# Patient Record
Sex: FEMALE | Race: BLACK OR AFRICAN AMERICAN | Employment: PART TIME | URBAN - METROPOLITAN AREA
[De-identification: names, ages, dates, MRNs, and addresses within clinical notes are randomized per-mention and may not be internally consistent; named-entity substitution may affect disease eponyms.]

---

## 2021-03-02 ENCOUNTER — HOSPITAL ENCOUNTER (INPATIENT)
Age: 36
LOS: 4 days | Discharge: HOME | End: 2021-03-06
Attending: OBSTETRICS & GYNECOLOGY | Admitting: OBSTETRICS & GYNECOLOGY
Payer: OTHER GOVERNMENT

## 2021-03-02 PROBLEM — Z33.1 IUP (INTRAUTERINE PREGNANCY), INCIDENTAL: Status: ACTIVE | Noted: 2021-03-02

## 2021-03-02 PROBLEM — Z3A.40 40 WEEKS GESTATION OF PREGNANCY: Status: ACTIVE | Noted: 2021-03-02

## 2021-03-02 LAB
ABO + RH BLD: NORMAL
BASOPHILS # BLD: 0 K/UL (ref 0–0.1)
BASOPHILS NFR BLD: 0 % (ref 0–2)
BLOOD GROUP ANTIBODIES SERPL: NORMAL
DIFFERENTIAL METHOD BLD: ABNORMAL
EOSINOPHIL # BLD: 0.1 K/UL (ref 0–0.4)
EOSINOPHIL NFR BLD: 1 % (ref 0–5)
ERYTHROCYTE [DISTWIDTH] IN BLOOD BY AUTOMATED COUNT: 13.6 % (ref 11.6–14.5)
HCT VFR BLD AUTO: 39.2 % (ref 35–45)
HGB BLD-MCNC: 13.3 G/DL (ref 12–16)
LYMPHOCYTES # BLD: 1.6 K/UL (ref 0.9–3.6)
LYMPHOCYTES NFR BLD: 20 % (ref 21–52)
MCH RBC QN AUTO: 32.4 PG (ref 24–34)
MCHC RBC AUTO-ENTMCNC: 33.9 G/DL (ref 31–37)
MCV RBC AUTO: 95.6 FL (ref 74–97)
MONOCYTES # BLD: 0.4 K/UL (ref 0.05–1.2)
MONOCYTES NFR BLD: 6 % (ref 3–10)
NEUTS SEG # BLD: 5.6 K/UL (ref 1.8–8)
NEUTS SEG NFR BLD: 73 % (ref 40–73)
PLATELET # BLD AUTO: 218 K/UL (ref 135–420)
PMV BLD AUTO: 10.5 FL (ref 9.2–11.8)
RBC # BLD AUTO: 4.1 M/UL (ref 4.2–5.3)
SPECIMEN EXP DATE BLD: NORMAL
WBC # BLD AUTO: 7.7 K/UL (ref 4.6–13.2)

## 2021-03-02 PROCEDURE — 65270000029 HC RM PRIVATE

## 2021-03-02 PROCEDURE — 77030028565 HC CATH CERV RIPNG BLN COOK -B

## 2021-03-02 PROCEDURE — 59200 INSERT CERVICAL DILATOR: CPT

## 2021-03-02 PROCEDURE — 85025 COMPLETE CBC W/AUTO DIFF WBC: CPT

## 2021-03-02 PROCEDURE — 75410000002 HC LABOR FEE PER 1 HR

## 2021-03-02 PROCEDURE — 86901 BLOOD TYPING SEROLOGIC RH(D): CPT

## 2021-03-02 RX ORDER — ASPIRIN 81 MG/1
TABLET ORAL DAILY
COMMUNITY
End: 2021-03-06

## 2021-03-02 RX ORDER — OXYTOCIN/RINGER'S LACTATE 30/500 ML
10 PLASTIC BAG, INJECTION (ML) INTRAVENOUS AS NEEDED
Status: COMPLETED | OUTPATIENT
Start: 2021-03-02 | End: 2021-03-04

## 2021-03-02 RX ORDER — HYDROMORPHONE HYDROCHLORIDE 1 MG/ML
1 INJECTION, SOLUTION INTRAMUSCULAR; INTRAVENOUS; SUBCUTANEOUS
Status: DISCONTINUED | OUTPATIENT
Start: 2021-03-02 | End: 2021-03-04 | Stop reason: HOSPADM

## 2021-03-02 RX ORDER — LIDOCAINE HYDROCHLORIDE 10 MG/ML
20 INJECTION, SOLUTION EPIDURAL; INFILTRATION; INTRACAUDAL; PERINEURAL AS NEEDED
Status: DISCONTINUED | OUTPATIENT
Start: 2021-03-02 | End: 2021-03-04 | Stop reason: HOSPADM

## 2021-03-02 RX ORDER — NALBUPHINE HYDROCHLORIDE 10 MG/ML
10 INJECTION, SOLUTION INTRAMUSCULAR; INTRAVENOUS; SUBCUTANEOUS
Status: DISCONTINUED | OUTPATIENT
Start: 2021-03-02 | End: 2021-03-04 | Stop reason: HOSPADM

## 2021-03-02 RX ORDER — METHYLERGONOVINE MALEATE 0.2 MG/ML
0.2 INJECTION INTRAVENOUS AS NEEDED
Status: DISCONTINUED | OUTPATIENT
Start: 2021-03-02 | End: 2021-03-04 | Stop reason: HOSPADM

## 2021-03-02 RX ORDER — CALCIUM CARBONATE/VITAMIN D3 600 MG-125
TABLET ORAL
COMMUNITY

## 2021-03-02 RX ORDER — TERBUTALINE SULFATE 1 MG/ML
0.25 INJECTION SUBCUTANEOUS
Status: DISCONTINUED | OUTPATIENT
Start: 2021-03-02 | End: 2021-03-04 | Stop reason: HOSPADM

## 2021-03-02 RX ORDER — MINERAL OIL
30 OIL (ML) ORAL AS NEEDED
Status: DISCONTINUED | OUTPATIENT
Start: 2021-03-02 | End: 2021-03-04 | Stop reason: HOSPADM

## 2021-03-02 RX ORDER — OXYTOCIN/0.9 % SODIUM CHLORIDE 30/500 ML
87.3 PLASTIC BAG, INJECTION (ML) INTRAVENOUS AS NEEDED
Status: COMPLETED | OUTPATIENT
Start: 2021-03-02 | End: 2021-03-04

## 2021-03-02 RX ORDER — MELATONIN
DAILY
COMMUNITY

## 2021-03-02 RX ORDER — OMEGA-3 FATTY ACIDS/FISH OIL 300-500 MG
CAPSULE ORAL
COMMUNITY

## 2021-03-02 RX ORDER — BUTORPHANOL TARTRATE 2 MG/ML
2 INJECTION INTRAMUSCULAR; INTRAVENOUS
Status: DISCONTINUED | OUTPATIENT
Start: 2021-03-02 | End: 2021-03-04 | Stop reason: HOSPADM

## 2021-03-02 RX ORDER — SODIUM CHLORIDE, SODIUM LACTATE, POTASSIUM CHLORIDE, CALCIUM CHLORIDE 600; 310; 30; 20 MG/100ML; MG/100ML; MG/100ML; MG/100ML
125 INJECTION, SOLUTION INTRAVENOUS CONTINUOUS
Status: DISCONTINUED | OUTPATIENT
Start: 2021-03-02 | End: 2021-03-04 | Stop reason: HOSPADM

## 2021-03-02 NOTE — PROGRESS NOTES
1600  at 40.6 weeks arrives to unit for scheduled induction. Pt taken to labor room 4. Pt oriented to room and call llamas. [address] [facility] Tomasz Vidal at bedside. SVE: closed    1730 Cook balloon placed 80/80ml. Pt tolerates well. 1930 Bedside and verbal report given to ZACH Nicolas

## 2021-03-03 PROBLEM — Z34.90 ENCOUNTER FOR INDUCTION OF LABOR: Status: ACTIVE | Noted: 2021-03-03

## 2021-03-03 PROCEDURE — 74011250636 HC RX REV CODE- 250/636: Performed by: ADVANCED PRACTICE MIDWIFE

## 2021-03-03 PROCEDURE — 65270000029 HC RM PRIVATE

## 2021-03-03 RX ORDER — OXYTOCIN/0.9 % SODIUM CHLORIDE 30/500 ML
0-20 PLASTIC BAG, INJECTION (ML) INTRAVENOUS
Status: DISCONTINUED | OUTPATIENT
Start: 2021-03-03 | End: 2021-03-06 | Stop reason: HOSPADM

## 2021-03-03 RX ORDER — FENTANYL/ROPIVACAINE/NS/PF 2MCG/ML-.1
PLASTIC BAG, INJECTION (ML) EPIDURAL
Status: COMPLETED
Start: 2021-03-03 | End: 2021-03-04

## 2021-03-03 RX ORDER — FENTANYL CITRATE 50 UG/ML
INJECTION, SOLUTION INTRAMUSCULAR; INTRAVENOUS
Status: COMPLETED
Start: 2021-03-03 | End: 2021-03-04

## 2021-03-03 RX ADMIN — SODIUM CHLORIDE, SODIUM LACTATE, POTASSIUM CHLORIDE, AND CALCIUM CHLORIDE 125 ML/HR: 600; 310; 30; 20 INJECTION, SOLUTION INTRAVENOUS at 23:56

## 2021-03-03 RX ADMIN — BUTORPHANOL TARTRATE 2 MG: 2 INJECTION, SOLUTION INTRAMUSCULAR; INTRAVENOUS at 18:30

## 2021-03-03 RX ADMIN — BUTORPHANOL TARTRATE 2 MG: 2 INJECTION, SOLUTION INTRAMUSCULAR; INTRAVENOUS at 21:22

## 2021-03-03 RX ADMIN — SODIUM CHLORIDE, SODIUM LACTATE, POTASSIUM CHLORIDE, AND CALCIUM CHLORIDE 125 ML/HR: 600; 310; 30; 20 INJECTION, SOLUTION INTRAVENOUS at 20:05

## 2021-03-03 RX ADMIN — Medication 1 MILLI-UNITS/MIN: at 05:57

## 2021-03-03 NOTE — PROGRESS NOTES
Labor Progress Note  Patient seen, fetal heart rate and contraction pattern evaluated, patient examined. Patient reports increased pain with uc's.  REquets vaginal exam  Patient Vitals for the past 1 hrs:   Temp   03/03/21 1806 97.9 °F (36.6 °C)       Physical Exam:  Cervical Exam:  5 cm dilated    80% effaced    0 station    Presenting Part: cephalic  Cervical Position: mid position  Membranes:  leaking clear fluid  Uterine Activity: Frequency: Every 4 minutes, Duration: 90 seconds and Intensity: moderate  Fetal Heart Rate: Baseline: 140 per minute  Variability: moderate  Accelerations: yes  Decelerations: none  Uterine contractions: regular, every 4 minutes    Assessment/Plan:  Reassuring fetal status, Labor  Progressing normally, Continue plan for vaginal delivery

## 2021-03-03 NOTE — PROGRESS NOTES
Labor Progress Note  Patient seen, fetal heart rate and contraction pattern evaluated, patient examined. Patient Vitals for the past 1 hrs:   BP Temp Pulse Resp   03/03/21 0716 104/73 97.7 °F (36.5 °C) 82 16       Physical Exam:  Cervical Exam:  4 cm dilated    80% effaced    -1 station    Presenting Part: cephalic  Cervical Position: mid position  Membranes:  Artificial Rupture of Membranes;  Amniotic Fluid: medium amount of clear fluid  Uterine Activity: Frequency: Every 3-5 minutes, Duration: 50 seconds and Intensity: mild  Fetal Heart Rate: Baseline: 130 per minute  Variability: moderate  Accelerations: yes  Decelerations: none  Uterine contractions: regular, every 3-5 minutes    Assessment/Plan:  Reassuring fetal status, Labor  Progressing normally, Continue plan for vaginal delivery

## 2021-03-03 NOTE — PROGRESS NOTES
0710 Bedside and Verbal shift change report given to [facility], RN and SN Fawn (oncoming nurse) by Rachna Castillo RN (offgoing nurse). Report included the following information SBAR, Kardex, Intake/Output, MAR and Recent Results. Patient resting in bed with  at bedside. Call bell within reach. No further needs at this time. Will continue to monitor. 812 [address] Priom Dominguez CNM at bedside discussing plan of care. SVE 4/80/-1.    0752 AROM moderate amount of clear fluid. 9525 Assisted patient to high fowlers chair with legs in margarito position. 7768 Patient up to restroom. Bed linens changed. 1212 Provided patient with wireless monitors and birthing ball. Patient to ambulate in room and use birthing ball. 1750 Assisted patient to right lateral side with left leg in stirrup. 1905 Bedside and Verbal shift change report given to CODEY Trinidad (oncoming nurse) by [facility], GIGI and SN Fawn (offgoing nurse). Report included the following information SBAR, Kardex, Intake/Output, MAR and Recent Results.

## 2021-03-03 NOTE — PROGRESS NOTES
Bedside shift change report given to CODEY Hinds RN (oncoming nurse) by Morgan Watts RN (offgoing nurse). Report included the following information SBAR, Intake/Output, MAR and Recent Results.

## 2021-03-03 NOTE — PROGRESS NOTES
Labor Progress Note  Patient seen, fetal heart rate and contraction pattern evaluated, patient examined. No data found. Physical Exam:  Cervical Exam:  Closed/Thick/High  Membranes:  Intact  Uterine Activity: Irregular  Fetal Heart Rate: Reactive  Baseline: 130 per minute  Variability: moderate  Accelerations: yes  Decelerations: none    Assessment:  IUP @ 40.6 wga here for IOL for postdates. Cook balloon to be placed followed by induction of labor with pitocin. Procedures, risks, and benefits of cook explained to patient with opportunity for answering all questions given. Verbal consent obtained. Patient was placed in lithotomy position. Elmo Walker catheter with stylet guide placed in cervical os without difficulty. Uterine balloon was tested with 50 cc then inflated to a total of 80 cc of sterile NS. Once uterine balloon was filled the vaginal balloon was filled and tested with 50 cc of NS. Once correct placement confirmed the vaginal balloon was filled to a total of 80 cc of NS. External catheter end secured to thigh. Patient tolerated procedure well. Fetal tracing remains reassuring.    Plan:  Continue to monitor for labor and reassuring fetal tracing  Anticipate   Expectant management  Plan to DC cook in 12 hours and start pitocin IV titration for IOL     Arlene Glover CNM

## 2021-03-03 NOTE — PROGRESS NOTES
Problem: Vaginal Delivery: Day of Deliver-Laboring  Goal: Activity/Safety  Outcome: Progressing Towards Goal  Goal: Nutrition/Diet  Outcome: Progressing Towards Goal  Goal: Medications  Outcome: Progressing Towards Goal  Goal: Treatments/Interventions/Procedures  Outcome: Progressing Towards Goal  Goal: *Vital signs within defined limits  Outcome: Progressing Towards Goal  Goal: *Labs within defined limits  Outcome: Progressing Towards Goal  Goal: *Hemodynamically stable  Outcome: Progressing Towards Goal  Goal: *Optimal pain control at patient's stated goal  Outcome: Progressing Towards Goal

## 2021-03-03 NOTE — H&P
History & Physical    Name: Alex Skinner MRN: [id number]  SSN: [id number]    YOB: 1985  Age: 30 y.o. Sex: female      Subjective:     Estimated Date of Delivery: 21  OB History    Para Term  AB Living   1             SAB TAB Ectopic Molar Multiple Live Births                    # Outcome Date GA Lbr Ricki/2nd Weight Sex Delivery Anes PTL Lv   1 Current                Ms. Andreas De La Torre is admitted with pregnancy at [address] [place] [address] for induction of labor due to postdates. Prenatal course was uncomplicated. Please see prenatal records for details. No past medical history on file. No past surgical history on file. Social History     Occupational History    Not on file   Tobacco Use    Smoking status: Not on file   Substance and Sexual Activity    Alcohol use: Not on file    Drug use: Not on file    Sexual activity: Not on file     No family history on file. No Known Allergies  Prior to Admission medications    Medication Sig Start Date End Date Taking? Authorizing Provider   PNV Comb #2-Iron-FA-Omega 3 29-1-400 mg cmpk Take  by mouth. Yes Provider, Historical   aspirin delayed-release 81 mg tablet Take  by mouth daily. Yes Provider, Historical   calcium-cholecalciferol, d3, (CALCIUM 600 + D) 600-125 mg-unit tab Take  by mouth. Yes Provider, Historical   cholecalciferol (Vitamin D3) (1000 Units /25 mcg) tablet Take  by mouth daily. Yes Provider, Historical   Iron BisGl &PS [id number]-[id number]-[id number] 084-86-11-4 mg-mg-mcg-mg cap Take  by mouth. Yes Provider, Historical   fish oil-omega-3 fatty acids (Fish Oil) 300-500 mg cap Take  by mouth. Yes Provider, Historical        Review of Systems: A comprehensive review of systems was negative.     Objective:     Vitals:  Vitals:    21 1633 21 1702 21 1931   BP: 117/74  106/65   Pulse: 96  90   Resp: 16  16   Temp: 97.4 °F (36.3 °C)  97.8 °F (36.6 °C)   Weight:  58.1 kg (128 lb)    Height:  5' 1\" (1.549 m)         Physical Exam:  Patient without distress.   Lung: normal respiratory effort  Abdomen: soft, nontender  Fundus: soft and non tender  Perineum: blood absent, amniotic fluid absent  Cervical Exam: Closed/Thick/High  Membranes:  Intact  Fetal Heart Rate: Reactive  Baseline: 130 per minute  Variability: moderate  Accelerations: yes  Decelerations: none          Prenatal Labs:   Lab Results   Component Value Date/Time    ABO/Rh(D) A POSITIVE 03/02/2021 04:40 PM       Impression/Plan:     Active Problems:    IUP (intrauterine pregnancy), incidental (3/2/2021)      40 weeks gestation of pregnancy (3/2/2021)         Plan:     [facility] for postdates induction of labor  Continuous EFM  Reassuring fetal status  Place IV/Routine labs  GBS negative   Place cook catheter   Start pitocin in am   Dr. Etta Bosch aware of pt admission and status      Liya Jones CNM

## 2021-03-03 NOTE — PROGRESS NOTES
0710 - Bedside and Verbal shift change report given to SN Gavin and CODEY Olson, GIGI (oncoming nurse) by Hever Serrano RN (offgoing nurse). Report included the following information SBAR, Kardex, Intake/Output, MAR and Recent Results. Patient is resting in bed with her  at bedside. Call light within reach. No further needs at this time and will continue to monitor. 26- M. Cheuvront at bedside discussing plan of care. SVE 4/80/-1.    0752- AROM. Clear and moderate amount of fluid. 5426- Assisted patient to high fowlers chair with legs in margarito position. 0500- Patient up to the bathroom. Linens changed on bed. 1212- Patient provided birthing ball. Patient to ambulate in room and use birthing ball. 65- M. Cheuvront at bedside discussing plan of care. SVE 5.5/80/0.    1912- Bedside and Verbal shift change report given to CODEY Quispe (oncoming nurse) by SN Srinivas and CODEY Olson RN (offgoing nurse). Report included the following information SBAR, Kardex, Intake/Output, MAR and Recent Results.

## 2021-03-04 ENCOUNTER — ANESTHESIA (OUTPATIENT)
Dept: LABOR AND DELIVERY | Age: 36
End: 2021-03-04
Payer: OTHER GOVERNMENT

## 2021-03-04 ENCOUNTER — ANESTHESIA EVENT (OUTPATIENT)
Dept: LABOR AND DELIVERY | Age: 36
End: 2021-03-04
Payer: OTHER GOVERNMENT

## 2021-03-04 PROCEDURE — 77030007879 HC KT SPN EPDRL TELE -B: Performed by: ANESTHESIOLOGY

## 2021-03-04 PROCEDURE — 74011000250 HC RX REV CODE- 250: Performed by: ANESTHESIOLOGY

## 2021-03-04 PROCEDURE — 75410000003 HC RECOV DEL/VAG/CSECN EA 0.5 HR

## 2021-03-04 PROCEDURE — 65270000029 HC RM PRIVATE

## 2021-03-04 PROCEDURE — 74011250636 HC RX REV CODE- 250/636

## 2021-03-04 PROCEDURE — 74011250636 HC RX REV CODE- 250/636: Performed by: ADVANCED PRACTICE MIDWIFE

## 2021-03-04 PROCEDURE — 75410000000 HC DELIVERY VAGINAL/SINGLE

## 2021-03-04 PROCEDURE — 74011250636 HC RX REV CODE- 250/636: Performed by: ANESTHESIOLOGY

## 2021-03-04 PROCEDURE — 74011250637 HC RX REV CODE- 250/637: Performed by: ADVANCED PRACTICE MIDWIFE

## 2021-03-04 PROCEDURE — 75410000002 HC LABOR FEE PER 1 HR

## 2021-03-04 PROCEDURE — 74011000250 HC RX REV CODE- 250

## 2021-03-04 PROCEDURE — 76060000078 HC EPIDURAL ANESTHESIA

## 2021-03-04 RX ORDER — ROPIVACAINE HYDROCHLORIDE 2 MG/ML
INJECTION, SOLUTION EPIDURAL; INFILTRATION; PERINEURAL AS NEEDED
Status: DISCONTINUED | OUTPATIENT
Start: 2021-03-04 | End: 2021-03-04 | Stop reason: HOSPADM

## 2021-03-04 RX ORDER — IBUPROFEN 400 MG/1
800 TABLET ORAL
Status: DISCONTINUED | OUTPATIENT
Start: 2021-03-04 | End: 2021-03-06 | Stop reason: HOSPADM

## 2021-03-04 RX ORDER — ACETAMINOPHEN 325 MG/1
650 TABLET ORAL
Status: DISCONTINUED | OUTPATIENT
Start: 2021-03-04 | End: 2021-03-06 | Stop reason: HOSPADM

## 2021-03-04 RX ORDER — OXYCODONE AND ACETAMINOPHEN 5; 325 MG/1; MG/1
2 TABLET ORAL
Status: DISCONTINUED | OUTPATIENT
Start: 2021-03-04 | End: 2021-03-06 | Stop reason: HOSPADM

## 2021-03-04 RX ORDER — ZOLPIDEM TARTRATE 5 MG/1
5 TABLET ORAL
Status: DISCONTINUED | OUTPATIENT
Start: 2021-03-04 | End: 2021-03-06 | Stop reason: HOSPADM

## 2021-03-04 RX ORDER — FENTANYL CITRATE 50 UG/ML
INJECTION, SOLUTION INTRAMUSCULAR; INTRAVENOUS AS NEEDED
Status: DISCONTINUED | OUTPATIENT
Start: 2021-03-04 | End: 2021-03-04 | Stop reason: HOSPADM

## 2021-03-04 RX ORDER — LIDOCAINE HYDROCHLORIDE AND EPINEPHRINE 15; 5 MG/ML; UG/ML
INJECTION, SOLUTION EPIDURAL AS NEEDED
Status: DISCONTINUED | OUTPATIENT
Start: 2021-03-04 | End: 2021-03-04 | Stop reason: HOSPADM

## 2021-03-04 RX ORDER — DIPHENHYDRAMINE HYDROCHLORIDE 50 MG/ML
12.5 INJECTION, SOLUTION INTRAMUSCULAR; INTRAVENOUS
Status: DISCONTINUED | OUTPATIENT
Start: 2021-03-03 | End: 2021-03-04 | Stop reason: HOSPADM

## 2021-03-04 RX ORDER — PROMETHAZINE HYDROCHLORIDE 25 MG/ML
25 INJECTION, SOLUTION INTRAMUSCULAR; INTRAVENOUS
Status: DISCONTINUED | OUTPATIENT
Start: 2021-03-04 | End: 2021-03-06 | Stop reason: HOSPADM

## 2021-03-04 RX ORDER — NALOXONE HYDROCHLORIDE 0.4 MG/ML
0.2 INJECTION, SOLUTION INTRAMUSCULAR; INTRAVENOUS; SUBCUTANEOUS AS NEEDED
Status: DISCONTINUED | OUTPATIENT
Start: 2021-03-03 | End: 2021-03-04 | Stop reason: HOSPADM

## 2021-03-04 RX ORDER — AMOXICILLIN 250 MG
1 CAPSULE ORAL
Status: DISCONTINUED | OUTPATIENT
Start: 2021-03-04 | End: 2021-03-06 | Stop reason: HOSPADM

## 2021-03-04 RX ORDER — EPHEDRINE SULFATE/0.9% NACL/PF 50 MG/5 ML
10 SYRINGE (ML) INTRAVENOUS AS NEEDED
Status: DISCONTINUED | OUTPATIENT
Start: 2021-03-03 | End: 2021-03-04 | Stop reason: HOSPADM

## 2021-03-04 RX ORDER — FENTANYL CITRATE 50 UG/ML
100 INJECTION, SOLUTION INTRAMUSCULAR; INTRAVENOUS ONCE
Status: COMPLETED | OUTPATIENT
Start: 2021-03-04 | End: 2021-03-04

## 2021-03-04 RX ORDER — FENTANYL/ROPIVACAINE/NS/PF 2MCG/ML-.1
1-15 PLASTIC BAG, INJECTION (ML) EPIDURAL
Status: DISCONTINUED | OUTPATIENT
Start: 2021-03-04 | End: 2021-03-04 | Stop reason: HOSPADM

## 2021-03-04 RX ADMIN — ROPIVACAINE HYDROCHLORIDE 4 ML: 2 INJECTION EPIDURAL; INFILTRATION; PERINEURAL at 00:42

## 2021-03-04 RX ADMIN — Medication 87.3 MILLI-UNITS/MIN: at 04:50

## 2021-03-04 RX ADMIN — IBUPROFEN 800 MG: 400 TABLET, FILM COATED ORAL at 16:43

## 2021-03-04 RX ADMIN — ACETAMINOPHEN 650 MG: 325 TABLET ORAL at 21:27

## 2021-03-04 RX ADMIN — IBUPROFEN 800 MG: 400 TABLET, FILM COATED ORAL at 08:19

## 2021-03-04 RX ADMIN — FENTANYL CITRATE 100 MCG: 50 INJECTION, SOLUTION INTRAMUSCULAR; INTRAVENOUS at 00:40

## 2021-03-04 RX ADMIN — Medication 10 ML/HR: at 00:41

## 2021-03-04 RX ADMIN — DOCUSATE SODIUM 50 MG AND SENNOSIDES 8.6 MG 1 TABLET: 8.6; 5 TABLET, FILM COATED ORAL at 21:27

## 2021-03-04 RX ADMIN — SODIUM CHLORIDE, SODIUM LACTATE, POTASSIUM CHLORIDE, AND CALCIUM CHLORIDE 125 ML/HR: 600; 310; 30; 20 INJECTION, SOLUTION INTRAVENOUS at 03:10

## 2021-03-04 RX ADMIN — FENTANYL CITRATE 100 MCG: 50 INJECTION, SOLUTION INTRAMUSCULAR; INTRAVENOUS at 00:42

## 2021-03-04 RX ADMIN — LIDOCAINE HYDROCHLORIDE,EPINEPHRINE BITARTRATE 3 ML: 15; .005 INJECTION, SOLUTION EPIDURAL; INFILTRATION; INTRACAUDAL; PERINEURAL at 00:37

## 2021-03-04 RX ADMIN — ROPIVACAINE HYDROCHLORIDE 3 ML: 2 INJECTION EPIDURAL; INFILTRATION; PERINEURAL at 00:40

## 2021-03-04 RX ADMIN — Medication 10 MG: at 02:29

## 2021-03-04 RX ADMIN — Medication 10000 MILLI-UNITS: at 04:36

## 2021-03-04 RX ADMIN — ROPIVACAINE HYDROCHLORIDE 3 ML: 2 INJECTION EPIDURAL; INFILTRATION; PERINEURAL at 00:41

## 2021-03-04 NOTE — ROUTINE PROCESS
0150: SBAR update to DIMITRY Baxter at 0112 unchanged; orders received for indwelling catheter insertion d/t no cervical change

## 2021-03-04 NOTE — PROGRESS NOTES
0830 TRANSFER - IN REPORT:    Verbal report received from Reva(name) on Gus Brooks  being received from L&D(unit) for routine progression of care      Report consisted of patients Situation, Background, Assessment and   Recommendations(SBAR). Information from the following report(s) SBAR, Procedure Summary, Intake/Output, MAR and Recent Results was reviewed with the receiving nurse. Opportunity for questions and clarification was provided. Assessment completed upon patients arrival to unit and care assumed. 2396 Notified by YOLA Mtz of MEWS 3.     0915 Pt assessment done; apical pulse noted at 98; see flowsheets for cares. Pt states pain improving after med given earlier    0934 KBraultCNM notified of MEWS and re-eval. No further orders at this time    1030 Pt resting; no needs verbalized    1145 Pt eating; family at bedside. No needs verbalized when asked    1250 pt resting quietly; denies needs when asked. Assessment  Unchanged    1400 VS noted; see flowsheets. Assisted w/ breastfeeding - see infant chart    1500 Resting quietly; no needs verbalized when asked    1630 to nursery w/ infant to observe bath    1745 mom states motrin given earlier helped. Quietly resting. Ice pad given for sumit area    1830 attempting to feed infant at present    1920 Bedside and Verbal shift change report given to [facility] (oncoming nurse) by Hieu Acosta (offgoing nurse). Report included the following information SBAR, Procedure Summary, MAR and Recent Results.

## 2021-03-04 NOTE — PROGRESS NOTES
0700- Patient resting in bed, with spouse at bedside, eating breakfast, denies need for pain meds at this time, assessment complete

## 2021-03-04 NOTE — L&D DELIVERY NOTE
Delivery Note    Obstetrician:  Candida Hutchison CNM    Assistant: none    Pre-Delivery Diagnosis: Term pregnancy, Induced labor and Single fetus    Post-Delivery Diagnosis: Living  infant(s) and Male    Intrapartum Event: None    Procedure: Spontaneous vaginal delivery    Epidural: YES    Monitor:  Fetal Heart Tones - External and Uterine Contractions - External    Indications for instrumental delivery: none    Estimated Blood Loss: 300    Episiotomy: none    Laceration(s):  2nd degree    Laceration(s) repair: YES    Presentation: Cephalic    Fetal Description: castillo    Fetal Position: Occiput Anterior    Birth Weight: not yet assessed    Birth Length: not yet assessed    Apgar - One Minute: 9    Apgar - Five Minutes: 9    Umbilical Cord: 3 vessels present  Specimens: placenta delivered intact  Complications:  none           Cord Blood Results:   Information for the patient's :  Shawn Ly [[id number]]   No results found for: PCTABR, PCTDIG, BILI, ABORH     Prenatal Labs:     Lab Results   Component Value Date/Time    ABO/Rh(D) A POSITIVE 2021 04:40 PM        Attending Attestation: I was present and scrubbed for the entire procedure

## 2021-03-04 NOTE — PROGRESS NOTES
1910 Bedside shift change report given to CODEY Cardozo (oncoming nurse) by Deny Horse student nurse with Jennifer Cui RN (offgoing nurse). Report included the following information SBAR, Kardex, Intake/Output, MAR, Recent Results and Quality Measures. GERMAN Dominguez CNM reviews strip, order to increase pitocin. 1912 pt sleeping comfortably    2050 pt rates pain 8/10, feeling some vaginal pressure     2052 pt up to void with RN assist    2100 attempt to sit on birthing ball, pt feels dizzy, pt to bed    2110 CNM bedside for SVE. SVE 6.5/90/+1. 05108 Carmelita Watson for another dose of stadol per [facility]    2115 pt to high rincon's. Heat pack applied to back    2155 pt to left lateral per CNM request. Pt unable to tolerate left lateral with peanut ball due to weight on lower leg. Pt to right lateral with peanut ball. 2226 pt repositioned to left lateral with right leg resting in stirrup. 2256 Pt reports feeling rectal pressure with contractions. pt repositioned to right lateral with left leg resting in stirrup, pillow under knee for support. 2315 pt feeling constant pressure. RN to perform SVE per CNM    2320 SVE unchanged from previous exam    9183 pt desires epidural. RN begins LR bolus. CNM udpated    0731 40 44 23 Anesthesia paged for epidural    2351 Anesthesia paged again    0000 pt up to restroom to void. Pt unable to void    0005 pt reports increased pressure, urge to push. SVE unchanged    0025 Dr. Jamie Cabrera  Bedside    0028 pt sitting up for epidural, RN attempts to trace FHR.  EFM tracing MHR    0029 time out    0031 procedure start    0035 RN remains at bedside attempting to trace FHR    0036 needle out, catheter in    0037 test dose    0040 bolus    0043 epidural complete, pt repositioned to semi-rincon's with left tilt    0049 Dr. Jamie Cabrera exits    0115 SVE unchanged    0140 pt sitting up on bedpan attempting to void    0149 pt unable to void, st. Cath inserted for 200mL    0151 pt to left lateral with peanut ball    0155 CNM orders jorgensen to be placed now and increase pitocin if FHR will tolerate it    0200 jorgensen placed with 2nd RN bedside, CODEY Myers, GIGI.    6080 SVE 7.5/90/+1    0203 left lateral with peanut ball    0219 right lateral with peanut ball    0320 pt to high rincon's. Jessika care provided, underbuttocks drapes changed. Pt denies feeling pressure, feels much better after getting some sleep    0352 RN updates CNM Cheuvront on SVE 10/100/+1. CNM en route to hospital.    0400 CNM bedside, order for jorgensen out. Jorgensen out 400mL urine    0409 CNM bedside. start pushing. RN remains at the bedside, continuously assessing fetal heart rate baseline, variability, periodic and episodic changes, and uterine activity, providing labor support, and continuously monitoring maternal status and vital signs. 6192  of viable male infant. Delayed cord clamping and infant skin to skin with tactile stimulation and bulb suction. Infant vigorous. 1728 postpartum pitocin running per protocol    0437 placenta delivered, intact    7857 repair in progress    2428 provider exits    0500 jessika care provided, jessika pad and ice pack applied. 0700 LR and pitocin infusions discontinued per protocol     0705 pt up to restroom to void with RN assist    0715 Bedside shift change report given to DEBRA Mckinney (oncoming nurse) by He Vines. Ingrid Phipps (offgoing nurse). Report included the following information SBAR, Kardex, MAR, Recent Results and Quality Measures.

## 2021-03-04 NOTE — PROGRESS NOTES
Problem: Patient Education: Go to Patient Education Activity  Goal: Patient/Family Education  3/3/2021 1934 by Larwance Fee  Outcome: Progressing Towards Goal  3/3/2021 1933 by Larwance Fee  Outcome: Progressing Towards Goal     Problem: Vaginal Delivery: Day of Deliver-Laboring  Goal: Activity/Safety  3/3/2021 1934 by Larwance Fee  Outcome: Progressing Towards Goal  3/3/2021 1933 by Larwance Fee  Outcome: Progressing Towards Goal  Goal: Consults, if ordered  3/3/2021 1934 by Larwance Fee  Outcome: Progressing Towards Goal  3/3/2021 1933 by Larwance Fee  Outcome: Progressing Towards Goal  Goal: Diagnostic Test/Procedures  3/3/2021 1934 by Larwance Fee  Outcome: Progressing Towards Goal  3/3/2021 1933 by Larwance Fee  Outcome: Progressing Towards Goal  Goal: Nutrition/Diet  3/3/2021 1934 by Larwance Fee  Outcome: Progressing Towards Goal  3/3/2021 1933 by Larwance Fee  Outcome: Progressing Towards Goal  Goal: Discharge Planning  3/3/2021 1934 by Larwance Fee  Outcome: Progressing Towards Goal  3/3/2021 1933 by Larwance Fee  Outcome: Progressing Towards Goal  Goal: Medications  3/3/2021 1934 by Larwance Fee  Outcome: Progressing Towards Goal  3/3/2021 1933 by Larwance Fee  Outcome: Progressing Towards Goal  Goal: Respiratory  3/3/2021 1934 by Larwance Fee  Outcome: Progressing Towards Goal  3/3/2021 1933 by Larwance Fee  Outcome: Progressing Towards Goal  Goal: Treatments/Interventions/Procedures  3/3/2021 1934 by Larwance Fee  Outcome: Progressing Towards Goal  3/3/2021 1933 by Larwance Fee  Outcome: Progressing Towards Goal  Goal: *Vital signs within defined limits  3/3/2021 1934 by Larwance Fee  Outcome: Progressing Towards Goal  3/3/2021 1933 by Larwance Fee  Outcome: Progressing Towards Goal  Goal: *Labs within defined limits  3/3/2021 1934 by Larwance Fee  Outcome: Progressing Towards Goal  3/3/2021 1933 by Anderson Staton  Outcome: Progressing Towards Goal  Goal: *Hemodynamically stable  3/3/2021 1934 by Anderson Staton  Outcome: Progressing Towards Goal  3/3/2021 1933 by Anderson Staton  Outcome: Progressing Towards Goal  Goal: *Optimal pain control at patient's stated goal  3/3/2021 1934 by Anderson Staton  Outcome: Progressing Towards Goal  3/3/2021 1933 by Anderson Staton  Outcome: Progressing Towards Goal     Problem: Pain  Goal: *Control of Pain  Outcome: Progressing Towards Goal     Problem: Patient Education: Go to Patient Education Activity  Goal: Patient/Family Education  Outcome: Progressing Towards Goal

## 2021-03-04 NOTE — ANESTHESIA PREPROCEDURE EVALUATION
Relevant Problems   No relevant active problems       Anesthetic History   No history of anesthetic complications            Review of Systems / Medical History  Patient summary reviewed, nursing notes reviewed and pertinent labs reviewed    Pulmonary  Within defined limits                 Neuro/Psych              Cardiovascular  Within defined limits                Exercise tolerance: >4 METS     GI/Hepatic/Renal     GERD           Endo/Other  Within defined limits           Other Findings            Physical Exam    Airway  Mallampati: II  TM Distance: 4 - 6 cm  Neck ROM: normal range of motion   Mouth opening: Normal     Cardiovascular               Dental      Comments: Chipped front uppers   Pulmonary                 Abdominal         Other Findings            Anesthetic Plan    ASA: 2  Anesthesia type: epidural            Anesthetic plan and risks discussed with: Patient      Risks of epidural, including but not limited to bleeding, infection, back pain, headache, seizure, nerve injury, and block failure discussed with patient and accepted.

## 2021-03-04 NOTE — PROGRESS NOTES
Labor Progress Note  Patient seen, fetal heart rate and contraction pattern evaluated, patient examined.   Patient Vitals for the past 1 hrs:   BP Pulse SpO2   03/03/21 2127   98 %   03/03/21 2122   100 %   03/03/21 2117   100 %   03/03/21 2112   100 %   03/03/21 2107  83 100 %   03/03/21 2106 121/78     03/03/21 2103   100 %       Physical Exam:  Cervical Exam:  7 cm dilated    90% effaced    +1 station    Presenting Part: cephalic  Cervical Position: anterior  Membranes:  leaking clear fluid  Uterine Activity: Frequency: Every 2-3 minutes, Duration: 70 seconds and Intensity: moderate  Fetal Heart Rate: Baseline: 130 per minute  Variability: moderate  Accelerations: yes  Decelerations: none    Assessment/Plan:  Reassuring fetal status, Labor  Progressing normally, Continue plan for vaginal delivery

## 2021-03-04 NOTE — LACTATION NOTE
Attempted feeding, but infant very spitty. Hand expression education completed with mom, but unable to express at this time. Mom verbalized understanding and no questions at this time. Encouraged to attempt feeding infant within 1-2 hours as mom wants to take a nap. Mom educated on breastfeeding basics--hunger cues, feeding on demand, waking baby if baby sleeps too long between feeds, importance of skin to skin, positioning and latching, risk of pacifier use and supplemental feedings, and importance of rooming in--and use of log sheet. Mom also educated on benefits of breastfeeding for herself and baby. Mom verbalized understanding. No questions at this time. Updated ROMULO Houston RN    Attempted feeding, but infant very spitty and sleepy. Attempted to hand express, but mom only able to express 1 drop and fed to infant. Encouraged to attempt feeding infant within 1 hour. Mom verbalized understanding and no questions at this time. Updated ROMULO Houston RN.    1320 Attempted feeding and infant able to latch off and on. Encouraged to continue frequent feedings until infant nurses for at least 10 minutes.

## 2021-03-04 NOTE — ANESTHESIA PROCEDURE NOTES
Epidural Block    Patient location during procedure: OB  Start time: 3/4/2021 12:25 AM  Reason for block: labor epidural  Staffing  Performed: attending   Anesthesiologist: Steve Powell MD  Preanesthetic Checklist  Completed: patient identified, IV checked, site marked, risks and benefits discussed, surgical consent, monitors and equipment checked, pre-op evaluation and timeout performed  Block Placement  Patient position: sitting  Prep: ChloraPrep  Sterility prep: cap, drape, gloves, mask and hand  Sedation level: no sedation  Patient monitoring: continuous pulse oximetry and heart rate (NIBP)  Location: lumbar  Lumbar location: L3-L4  Epidural  Loss of resistance technique: saline  Guidance: landmark technique  Needle  Needle type: Tuohy   Needle gauge: 17 G  Needle insertion depth: 6 cm  Catheter type: multi-orifice  Catheter size: 19 G  Catheter at skin depth: 12 cm  Catheter securement method: clear occlusive dressing and surgical tape  Test dose: negative  Assessment  Block outcome: pain improved  Number of attempts: 1  Procedure assessment: patient tolerated procedure well with no complications

## 2021-03-05 LAB
HCT VFR BLD AUTO: 26.6 % (ref 35–45)
HGB BLD-MCNC: 9.3 G/DL (ref 12–16)

## 2021-03-05 PROCEDURE — 85018 HEMOGLOBIN: CPT

## 2021-03-05 PROCEDURE — 36415 COLL VENOUS BLD VENIPUNCTURE: CPT

## 2021-03-05 PROCEDURE — 65270000029 HC RM PRIVATE

## 2021-03-05 PROCEDURE — 85014 HEMATOCRIT: CPT

## 2021-03-05 PROCEDURE — 74011250637 HC RX REV CODE- 250/637: Performed by: ADVANCED PRACTICE MIDWIFE

## 2021-03-05 RX ORDER — IBUPROFEN 800 MG/1
800 TABLET ORAL
Qty: 90 TAB | Refills: 1 | Status: SHIPPED | OUTPATIENT
Start: 2021-03-05

## 2021-03-05 RX ADMIN — IBUPROFEN 800 MG: 400 TABLET, FILM COATED ORAL at 23:40

## 2021-03-05 RX ADMIN — IBUPROFEN 800 MG: 400 TABLET, FILM COATED ORAL at 15:39

## 2021-03-05 RX ADMIN — DOCUSATE SODIUM 50 MG AND SENNOSIDES 8.6 MG 1 TABLET: 8.6; 5 TABLET, FILM COATED ORAL at 08:58

## 2021-03-05 NOTE — LACTATION NOTE
This note was copied from a baby's chart. [phone] [id number] per mom, infant latching and nursing well for 30 minutes at 1430. DOL discussed. Mom verbalized understanding. Encouraged to call for assistance as needed. 0  is very sleepy. Per parents, have been trying to wake for a feeding for several hours. Discussed DOL behaviors and expectations. Hand expression education completed with mom and handout with spoon given for reinforcement. Showed how to feed infant expressed colostrum with spoon. Mom verbalized understanding and no questions at this time. Spoon fed several drops. Placed  skin to skin with mom. Encouraged to attempt within the hour.

## 2021-03-05 NOTE — DISCHARGE SUMMARY
Obstetrical Discharge Summary     Name: Suleiman Joseph MRN: [id number]  SSN: [id number]    YOB: 1985  Age: 30 y.o. Sex: female      Allergies: Patient has no known allergies. Admit Date: 3/2/2021    Discharge Date: 3/6/2021    Admitting Physician: Cherie Rodriguez MD     Attending Physician:  Ena Garcia MD     * Admission Diagnoses: Encounter for induction of labor [Z34.90]    * Discharge Diagnoses:   Information for the patient's :  Ashkan Pack [[id number]]   Delivery of a 3.285 kg male infant via Vaginal, Spontaneous on 3/4/2021 at 4:28 AM  by [address],. Apgars were 9  and 9 . Additional Diagnoses:   Hospital Problems as of 3/5/2021 Date Reviewed: 3/4/2021          Codes Class Noted - Resolved POA    Encounter for induction of labor ICD-10-CM: Z34.90  ICD-9-CM: V22.1  3/3/2021 - Present Unknown        IUP (intrauterine pregnancy), incidental ICD-10-CM: Z33.1  ICD-9-CM: V22.2  3/2/2021 - Present Yes        40 weeks gestation of pregnancy ICD-10-CM: Z3A.40  ICD-9-CM: V22.2  3/2/2021 - Present Yes             Lab Results   Component Value Date/Time    ABO/Rh(D) A POSITIVE 2021 04:40 PM    There is no immunization history for the selected administration types on file for this patient. * Procedures:   * No surgery found *           * Discharge Condition: good    [facility] Course: Normal hospital course following the delivery. * Disposition: Home    Discharge Medications:   Current Discharge Medication List      START taking these medications    Details   ibuprofen (MOTRIN) 800 mg tablet Take 1 Tab by mouth every eight (8) hours as needed for Pain. Qty: 90 Tab, Refills: 1         CONTINUE these medications which have NOT CHANGED    Details   PNV Comb #2-Iron-FA-Omega 3 29-1-400 mg cmpk Take  by mouth.      calcium-cholecalciferol, d3, (CALCIUM 600 + D) 600-125 mg-unit tab Take  by mouth.       cholecalciferol (Vitamin D3) (1000 Units /25 mcg) tablet Take  by mouth daily. fish oil-omega-3 fatty acids (Fish Oil) 300-500 mg cap Take  by mouth. STOP taking these medications       aspirin delayed-release 81 mg tablet Comments:   Reason for Stopping:         Iron BisGl &PS [id number] [id number] mg-mg-mcg-mg cap Comments:   Reason for Stopping:               * Follow-up Care/Patient Instructions: Activity: Activity as tolerated, No sex for 6 weeks, No driving while on analgesics and No heavy lifting for 6 weeks  Diet: Regular Diet  Wound Care: pericare as directed    Follow-up Information     Follow up With Specialties Details Why Contact Info    Zainab Joshua MD Family Medicine   [facility]  [phone] [address] [facility]  Call Call next week to schedule routine 6 week postpartum visit. [address]  [phone]                      .

## 2021-03-05 NOTE — PROGRESS NOTES
Problem: Patient Education: Go to Patient Education Activity  Goal: Patient/Family Education  3/5/2021 1404 by Gage Singleton LPN  Outcome: Progressing Towards Goal  3/5/2021 1403 by Gage Singleton LPN  Outcome: Progressing Towards Goal     Problem: Pain  Goal: *Control of Pain  3/5/2021 1404 by Gage Singleton LPN  Outcome: Progressing Towards Goal  3/5/2021 1403 by Gage Singleton LPN  Outcome: Progressing Towards Goal     Problem: Patient Education: Go to Patient Education Activity  Goal: Patient/Family Education  3/5/2021 1404 by Gage Singleton LPN  Outcome: Progressing Towards Goal  3/5/2021 1403 by Gage Singleton LPN  Outcome: Progressing Towards Goal     Problem: Vaginal Delivery: Postpartum Day 1  Goal: Activity/Safety  Outcome: Progressing Towards Goal  Goal: Consults, if ordered  Outcome: Progressing Towards Goal  Goal: Diagnostic Test/Procedures  Outcome: Progressing Towards Goal  Goal: Nutrition/Diet  Outcome: Progressing Towards Goal  Goal: Discharge Planning  Outcome: Progressing Towards Goal  Goal: Medications  Outcome: Progressing Towards Goal  Goal: Treatments/Interventions/Procedures  Outcome: Progressing Towards Goal  Goal: Psychosocial  Outcome: Progressing Towards Goal  Goal: *Vital signs within defined limits  Outcome: Progressing Towards Goal  Goal: *Labs within defined limits  Outcome: Progressing Towards Goal  Goal: *Hemodynamically stable  Outcome: Progressing Towards Goal  Goal: *Optimal pain control at patient's stated goal  Outcome: Progressing Towards Goal  Goal: *Participates in infant care  Outcome: Progressing Towards Goal  Goal: *Demonstrates progressive activity  Outcome: Progressing Towards Goal  Goal: *Performs self perineal care  Outcome: Progressing Towards Goal  Goal: *Appropriate parent-infant bonding  Outcome: Progressing Towards Goal  Goal: *Tolerating diet  Outcome: Progressing Towards Goal  Goal: *Performs self breast care  Outcome: Progressing Towards Goal

## 2021-03-05 NOTE — LACTATION NOTE
This note was copied from a baby's chart. 1658 infant latching and nursing well with NS. Reviewed Breastfeeding discharge teaching completed to include feeding on demand, foremilk and hindmilk importance, engorgement, mastitis, clogged ducts, pumping, breastmilk storage, and returning to work. Information given about unit and office phone numbers and encouraged mom to reach out if concerns arise, but that [facility] would be calling her in the next few days to follow up on breastfeeding. Mom verbalized understanding and no questions at this time.

## 2021-03-05 NOTE — ANESTHESIA POSTPROCEDURE EVALUATION
3/5/2021  3:13 PM    Laboring Epidural Follow-up Note       Referring physician: Pamela Way,*   Patient status post vaginal delivery with labor epidural.      Visit Vitals  /70 (BP 1 Location: Right arm)   Pulse 81   Temp 36.6 °C (97.8 °F)   Resp 16   Ht 5' 1\" (1.549 m)   Wt 58.1 kg (128 lb)   SpO2 99%   Breastfeeding Unknown   BMI 24.19 kg/m²       Patient ambulating and voiding without difficulty. Patient denies headache. Patient denies backache.     Marco Munoz CRNA

## 2021-03-05 NOTE — PROGRESS NOTES
Problem: Patient Education: Go to Patient Education Activity  Goal: Patient/Family Education  Outcome: Progressing Towards Goal     Problem: Vaginal Delivery: Day of Delivery-Post delivery  Goal: Activity/Safety  Outcome: Resolved/Met  Goal: Consults, if ordered  Outcome: Resolved/Met  Goal: Nutrition/Diet  Outcome: Resolved/Met  Goal: Discharge Planning  Outcome: Resolved/Met  Goal: Medications  Outcome: Resolved/Met  Goal: Treatments/Interventions/Procedures  Outcome: Resolved/Met  Goal: *Vital signs within defined limits  Outcome: Resolved/Met  Goal: *Labs within defined limits  Outcome: Resolved/Met  Goal: *Hemodynamically stable  Outcome: Resolved/Met  Goal: *Optimal pain control at patient's stated goal  Outcome: Resolved/Met  Goal: *Participates in infant care  Outcome: Resolved/Met  Goal: *Demonstrates progressive activity  Outcome: Resolved/Met  Goal: *Tolerating diet  Outcome: Resolved/Met     Problem: Pain  Goal: *Control of Pain  Outcome: Progressing Towards Goal     Problem: Patient Education: Go to Patient Education Activity  Goal: Patient/Family Education  Outcome: Progressing Towards Goal

## 2021-03-05 NOTE — PROGRESS NOTES
1920 Bedside and Verbal shift change report given to JACOB Dunaway RN (oncoming nurse) by Zulma Milan RN (offgoing nurse). Report included the following information SBAR, Kardex, Intake/Output, MAR and Recent Results. 2045 attempted to do mother assessment at this time but unable, mother working with lactation at this time. No other needs to be addressed. 2130 tylenol and pericolace given, shift assessment complete. 0025 vitals checked no other needs to be addressed at this time. 0215 patient in bed at this time. 8156 patient resting with no needs to be addressed. 0321 patient In room resting with no needs to be addressed at this time. 0725 Bedside and Verbal shift change report given to RON Reis LPN (oncoming nurse) by Merlinda Govern RN (offgoing nurse). Report included the following information SBAR, Kardex, Intake/Output, MAR and Recent Results.

## 2021-03-05 NOTE — LACTATION NOTE
Per mom, infant latching and nursing well--better than yesterday. DOL expectations discussed. Will page for feeds. 9854 Infant able to latch off and on for 5 minutes during 15 minute attempt. Encouraged to feed q 1-2 hours today. Mom verbalized understanding and no questions at this time. 1340 Infant able to latch off and on, but can't maintain suck. Nipple shield applied and infant able to nurse much better, but still needs stimulation to continue sucking. Encouraged frequent feeds until infant is nursing consistently well on his own.

## 2021-03-05 NOTE — PROGRESS NOTES
Assumed care of pt.  0835-VSS. Assessment completed. Denies needs at this time. 0852-stool softner given. 1035-skin to skin. 1200-visitors in room. 1300-infant out to room. Denies needs. 1415-resting in bed. Denies needs. 1539-VSS. Reassessment completed. Pain med given. Pos BM. Denies needs  1640-pain med effective. Denies needs. 1800-awake in bed. Denies needs. 1925-Bedside and Verbal shift change report given to DEVENDRA Blair RN  (oncoming nurse) by RON Reis LPN (offgoing nurse). Report given with SBAR, Kardex, Intake/Output, MAR and Recent Results.

## 2021-03-06 VITALS
TEMPERATURE: 97.8 F | BODY MASS INDEX: 24.17 KG/M2 | DIASTOLIC BLOOD PRESSURE: 72 MMHG | SYSTOLIC BLOOD PRESSURE: 109 MMHG | HEART RATE: 81 BPM | OXYGEN SATURATION: 99 % | WEIGHT: 128 LBS | HEIGHT: 61 IN | RESPIRATION RATE: 18 BRPM

## 2021-03-06 PROCEDURE — 74011250637 HC RX REV CODE- 250/637: Performed by: ADVANCED PRACTICE MIDWIFE

## 2021-03-06 RX ADMIN — IBUPROFEN 800 MG: 400 TABLET, FILM COATED ORAL at 12:12

## 2021-03-06 NOTE — PROGRESS NOTES
5405 Received report from Antony Fraire RN. Checked on patient and discussed plan of the day to include discharge and baby blood work. 1130 Assessed patient. All WNL. Patient plans to go home today but is waiting to hear about baby's bilirubin results. Explained that patient may be discharged but allowed to stay at hospital if baby needs extended care with bili lights. 1400 Time spent with patient reviewing discharge instructions to include the following information that was also provided in written form to the patient; normal vaginal bleeding to expect how to care for perineum and how to respond should she experience an increase in vaginal bleeding. Patient instructed that she should not lift more than the weight of her baby for at least a week (2 weeks if she delivered by  section). She was encouraged to stay hydrated and informed that she should not have sex, douche, use tampons,  or place anything in the vagina until her postpartum visit. Additionally she was instructed not to use tub baths, hot tubs or pools until cleared by her midwife or physician. Patient was informed that some swelling of her legs can be expected after delivery and to elevate them whenever possible. If the swelling increases or she has signs of calf pain/tenderness, or redness that is present in one leg more than the other she is to notify her provider or present in the emergency department for evaluation if unable to reach provider. Patient was given signs and symptoms of infection and instructed to call provider with temperature equal to or > than 100.4, foul smelling blood or discharge from the vagina, and increase in redness or discharge from incisions or an open wound that is not healing.   Patient was also instructed on the signs of postpartum depression and instructed that if she is considering harming herself or her infant, feels out of control, unable to care for herself or baby, feels sad or depressed most of the day every day, is having trouble sleeping or sleeping too much or is having trouble bonding with her baby she is to call her provider or present in the emergency department. Patient was also provided with signs and symptoms of a pulmonary embolism (PE). She was told what a PE is and instructed to call 911 if she experiences SOB (fast, shallow, rapid respirations) at rest, chest pain that worsens when coughing or change in her level of consciousness. Patient was informed that she might experience blood pressure changes during the postpartum weeks and that she should contact her provider with any severe, constant headaches that do not respond to over-the-counter pain medication, rest and/or hydration. She is also to call her provider with any changes in vision, seeing spots, or flashing lights, pain in the upper right quadrant of the abdomen, swelling of the face, hands, and/or legs more than what is expected or a change in her level of consciousness. Patient was strongly encouraged to keep her postpartum appointment and emphasized the importance of telling all providers her delivery date up until one year after the birth of her baby. Date of postpartum visit was confirmed prior to delivery. All questions were answered and patient voiced understanding of the information provided.

## 2021-03-06 NOTE — PROGRESS NOTES
Problem: Pain  Goal: *Control of Pain  Outcome: Progressing Towards Goal     Problem: Vaginal Delivery: Postpartum 2  Goal: Activity/Safety  Outcome: Progressing Towards Goal  Goal: Discharge Planning  Outcome: Progressing Towards Goal  Goal: Medications  Outcome: Progressing Towards Goal

## 2021-03-06 NOTE — PROGRESS NOTES
2315 Bedside report received from MARTA Bello. Pt. Stable. Needs addressed. Callbell within reach. 2330 Pt rated pain 4/10.    2339 Pt received pain medication. Fundus firm at U with small rubra lochia. No clots noted. 0030 Pt resting comfortably with call bell in reach and bed in lowest position. 0240 Pt sleeping with call bell in reach and bed in lowest position. 0450 Pt sleeping with call bell in reach and bed in lowest position      0640 Pt sitting in bed with call bell in reach and bed in lowest position. 0720 Bedside shift change report given to lOga Hart RN (oncoming nurse) by Luis Hutchison RN (offgoing nurse). Report included the following information SBAR, Kardex, Procedure Summary, MAR and Recent Results.

## 2024-11-26 NOTE — PROGRESS NOTES
----- Message from JACQUELINE Mi CNP sent at 11/25/2024  8:56 AM EST -----  Please let Cassie know her fecal calprotectin is elevated.  Will discuss at follow up.    Problem: Vaginal Delivery: Day of Delivery-Post delivery  Goal: Activity/Safety  Outcome: Progressing Towards Goal  Goal: Nutrition/Diet  Outcome: Progressing Towards Goal  Goal: Discharge Planning  Outcome: Progressing Towards Goal  Goal: Medications  Outcome: Progressing Towards Goal  Goal: *Vital signs within defined limits  Outcome: Progressing Towards Goal  Goal: *Labs within defined limits  Outcome: Progressing Towards Goal  Goal: *Hemodynamically stable  Outcome: Progressing Towards Goal  Goal: *Optimal pain control at patient's stated goal  Outcome: Progressing Towards Goal  Goal: *Participates in infant care  Outcome: Progressing Towards Goal  Goal: *Demonstrates progressive activity  Outcome: Progressing Towards Goal  Goal: *Tolerating diet  Outcome: Progressing Towards Goal